# Patient Record
Sex: FEMALE | Race: OTHER
[De-identification: names, ages, dates, MRNs, and addresses within clinical notes are randomized per-mention and may not be internally consistent; named-entity substitution may affect disease eponyms.]

---

## 2019-04-10 ENCOUNTER — HOSPITAL ENCOUNTER (EMERGENCY)
Dept: HOSPITAL 54 - ER | Age: 29
Discharge: HOME | End: 2019-04-10
Payer: COMMERCIAL

## 2019-04-10 VITALS — BODY MASS INDEX: 33.34 KG/M2 | HEIGHT: 68 IN | WEIGHT: 220 LBS

## 2019-04-10 VITALS — SYSTOLIC BLOOD PRESSURE: 103 MMHG | DIASTOLIC BLOOD PRESSURE: 77 MMHG

## 2019-04-10 DIAGNOSIS — T78.1XXA: Primary | ICD-10-CM
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
## 2019-04-10 PROCEDURE — A4216 STERILE WATER/SALINE, 10 ML: HCPCS

## 2019-04-10 PROCEDURE — 96372 THER/PROPH/DIAG INJ SC/IM: CPT

## 2019-04-10 PROCEDURE — 96374 THER/PROPH/DIAG INJ IV PUSH: CPT

## 2019-04-10 PROCEDURE — 96375 TX/PRO/DX INJ NEW DRUG ADDON: CPT

## 2019-04-10 PROCEDURE — 99283 EMERGENCY DEPT VISIT LOW MDM: CPT

## 2019-04-10 NOTE — NUR
PT BIBSELF COMPLAINING OF ALLERGIC REACTION. PT STATES AROUND 2100 SHE ATE FOOD 
THAT CONTAINED ALMONDS AND CASHEWS. PT DOES NOT HAVE HX OF ALLERGIES. PT HAS 
REDNESS OVER FACE AND UPPER EXT. PT FEELS AS IF "THROAT IS CLOSING" AND ITCHY 
MOUTH. PT PUT ON THE CARDIAC MONITOR AND PULSE OX. PT TACHYCARDIC ON THE 
MONITOR. PT SATURATION 99%